# Patient Record
Sex: MALE | Race: WHITE | NOT HISPANIC OR LATINO
[De-identification: names, ages, dates, MRNs, and addresses within clinical notes are randomized per-mention and may not be internally consistent; named-entity substitution may affect disease eponyms.]

---

## 2018-06-07 ENCOUNTER — APPOINTMENT (OUTPATIENT)
Dept: ORTHOPEDIC SURGERY | Facility: CLINIC | Age: 33
End: 2018-06-07

## 2018-06-07 PROBLEM — Z00.00 ENCOUNTER FOR PREVENTIVE HEALTH EXAMINATION: Status: ACTIVE | Noted: 2018-06-07

## 2018-07-13 ENCOUNTER — APPOINTMENT (OUTPATIENT)
Dept: ORTHOPEDIC SURGERY | Facility: CLINIC | Age: 33
End: 2018-07-13
Payer: COMMERCIAL

## 2018-07-13 VITALS — WEIGHT: 185 LBS | HEIGHT: 71 IN | RESPIRATION RATE: 16 BRPM | BODY MASS INDEX: 25.9 KG/M2

## 2018-07-13 DIAGNOSIS — Z78.9 OTHER SPECIFIED HEALTH STATUS: ICD-10-CM

## 2018-07-13 PROCEDURE — 99203 OFFICE O/P NEW LOW 30 MIN: CPT

## 2018-07-13 PROCEDURE — 76882 US LMTD JT/FCL EVL NVASC XTR: CPT | Mod: RT

## 2018-07-13 RX ORDER — ESCITALOPRAM OXALATE 10 MG/1
10 TABLET ORAL
Qty: 30 | Refills: 0 | Status: DISCONTINUED | COMMUNITY
Start: 2018-04-18

## 2018-07-13 RX ORDER — AZELASTINE HYDROCHLORIDE 137 UG/1
0.1 SPRAY, METERED NASAL
Qty: 30 | Refills: 0 | Status: DISCONTINUED | COMMUNITY
Start: 2018-05-10

## 2018-07-13 RX ORDER — TRAZODONE HYDROCHLORIDE 50 MG/1
50 TABLET ORAL
Qty: 30 | Refills: 0 | Status: DISCONTINUED | COMMUNITY
Start: 2018-04-25

## 2018-07-13 RX ORDER — OSELTAMIVIR PHOSPHATE 75 MG/1
75 CAPSULE ORAL
Qty: 10 | Refills: 0 | Status: DISCONTINUED | COMMUNITY
Start: 2018-01-17

## 2018-07-13 RX ORDER — CLONAZEPAM 0.5 MG/1
0.5 TABLET ORAL
Qty: 180 | Refills: 0 | Status: DISCONTINUED | COMMUNITY
Start: 2018-03-01

## 2018-07-13 RX ORDER — ESCITALOPRAM OXALATE 20 MG/1
20 TABLET ORAL
Qty: 30 | Refills: 0 | Status: DISCONTINUED | COMMUNITY
Start: 2018-06-27

## 2018-07-13 RX ORDER — MOMETASONE 50 UG/1
50 SPRAY, METERED NASAL
Qty: 17 | Refills: 0 | Status: DISCONTINUED | COMMUNITY
Start: 2018-04-27

## 2018-09-25 ENCOUNTER — APPOINTMENT (OUTPATIENT)
Dept: ORTHOPEDIC SURGERY | Facility: CLINIC | Age: 33
End: 2018-09-25
Payer: COMMERCIAL

## 2018-09-25 VITALS — HEIGHT: 71 IN | BODY MASS INDEX: 25.9 KG/M2 | WEIGHT: 185 LBS | RESPIRATION RATE: 16 BRPM

## 2018-09-25 PROCEDURE — 20605 DRAIN/INJ JOINT/BURSA W/O US: CPT | Mod: RT

## 2018-09-25 PROCEDURE — 99214 OFFICE O/P EST MOD 30 MIN: CPT | Mod: 25

## 2018-12-10 ENCOUNTER — APPOINTMENT (OUTPATIENT)
Dept: ORTHOPEDIC SURGERY | Facility: CLINIC | Age: 33
End: 2018-12-10
Payer: COMMERCIAL

## 2018-12-10 VITALS — BODY MASS INDEX: 25.9 KG/M2 | HEIGHT: 71 IN | RESPIRATION RATE: 16 BRPM | WEIGHT: 185 LBS

## 2018-12-10 DIAGNOSIS — M25.531 PAIN IN RIGHT WRIST: ICD-10-CM

## 2018-12-10 DIAGNOSIS — R22.31 LOCALIZED SWELLING, MASS AND LUMP, RIGHT UPPER LIMB: ICD-10-CM

## 2018-12-10 PROCEDURE — 99214 OFFICE O/P EST MOD 30 MIN: CPT

## 2018-12-18 ENCOUNTER — CLINICAL ADVICE (OUTPATIENT)
Age: 33
End: 2018-12-18

## 2019-01-25 ENCOUNTER — APPOINTMENT (OUTPATIENT)
Dept: ORTHOPEDIC SURGERY | Facility: CLINIC | Age: 34
End: 2019-01-25
Payer: COMMERCIAL

## 2019-01-25 VITALS — RESPIRATION RATE: 16 BRPM | HEIGHT: 71 IN | WEIGHT: 185 LBS | BODY MASS INDEX: 25.9 KG/M2

## 2019-01-25 DIAGNOSIS — S69.82XD OTHER SPECIFIED INJURIES OF LEFT WRIST, HAND AND FINGER(S), SUBSEQUENT ENCOUNTER: ICD-10-CM

## 2019-01-25 DIAGNOSIS — S69.81XD OTHER SPECIFIED INJURIES OF RIGHT WRIST, HAND AND FINGER(S), SUBSEQUENT ENCOUNTER: ICD-10-CM

## 2019-01-25 DIAGNOSIS — M77.01 MEDIAL EPICONDYLITIS, RIGHT ELBOW: ICD-10-CM

## 2019-01-25 PROCEDURE — 73070 X-RAY EXAM OF ELBOW: CPT | Mod: RT

## 2019-01-25 PROCEDURE — 99214 OFFICE O/P EST MOD 30 MIN: CPT

## 2019-01-25 PROCEDURE — 73110 X-RAY EXAM OF WRIST: CPT | Mod: LT

## 2020-01-24 ENCOUNTER — APPOINTMENT (OUTPATIENT)
Dept: COLORECTAL SURGERY | Facility: CLINIC | Age: 35
End: 2020-01-24
Payer: COMMERCIAL

## 2020-01-24 VITALS
HEIGHT: 71 IN | BODY MASS INDEX: 26.69 KG/M2 | HEART RATE: 61 BPM | DIASTOLIC BLOOD PRESSURE: 68 MMHG | SYSTOLIC BLOOD PRESSURE: 118 MMHG | TEMPERATURE: 98 F | WEIGHT: 190.63 LBS

## 2020-01-24 DIAGNOSIS — L29.0 PRURITUS ANI: ICD-10-CM

## 2020-01-24 DIAGNOSIS — K64.4 RESIDUAL HEMORRHOIDAL SKIN TAGS: ICD-10-CM

## 2020-01-24 PROCEDURE — 46600 DIAGNOSTIC ANOSCOPY SPX: CPT

## 2020-01-24 PROCEDURE — 99204 OFFICE O/P NEW MOD 45 MIN: CPT | Mod: 25

## 2020-01-24 RX ORDER — ANORECTAL OINTMENT 15.7; .44; 24; 20.6 G/100G; G/100G; G/100G; G/100G
OINTMENT TOPICAL
Refills: 0 | Status: ACTIVE | COMMUNITY

## 2020-01-24 NOTE — HISTORY OF PRESENT ILLNESS
[FreeTextEntry1] : 34 year  old male with alex of anal pruritis  reports development of skin tag over the last year.  Rare  bleeding. \par Had biopsy that showed benign changes only (1 year ago)\par has used hydrocortisone in the past,  sometimes helps  and sometimes not. \par \par bowel movements are 4-5  times a week, generally formed,  occasional straining.  \par \par No prior colonoscopy.\par \par Family hx of pancreatic cancer (Dads mother.  No colorectal cancer.

## 2020-01-24 NOTE — PHYSICAL EXAM
[Abdomen Masses] : No abdominal masses [Abdomen Tenderness] : ~T No ~M abdominal tenderness [Normal rectal exam] : exam was normal [None] : no anal fissures seen [Excoriation] : excoriations [Fistula] : no fistulas [Wart] : no warts [Ulcer ___ cm] : no ulcers [Tender, Swollen] : nontender, non-swollen [Thrombosed] : that was not thrombosed [Skin Tags] : residual hemorrhoidal skin tags were noted [Normal] : was normal [Normal Heart Sounds] : normal heart sounds [2+] : left 2+ [Petechiae] : no petechiae [Purpura] : no purpura  [Skin Ulcer] : ulcer [Alert] : alert [Skin Induration] : no induration [Oriented to Person] : oriented to person [Calm] : calm [Oriented to Place] : oriented to place [de-identified] : benign [de-identified] : posterior perianalk tag 1 cm x 3 mm with ulcer at outer aspect (2 x 3 mm ulcer.   [de-identified] : internal exam WNL [de-identified] : anocopy: no fissure, no hemorrhoids [de-identified] : posterior perianal (as above)

## 2020-01-24 NOTE — ASSESSMENT
[FreeTextEntry1] : Has well treated pruritus ani with 1 skin tag and a small ulcer at the base of the tag (part farthest away from the anus). Had calmoseptine in place. Had biopsy 1 years ago.  Bx showed benign changes see report.  INflammatory changes only.\par Stop calmoseptine\par Instead try desitin or zinc oxide\par If that doesn’t help then, in 3-4 week, local anesthesia and then AgNO3 Rx of the ulcerated area.\par anus itself looks ok. No significant hemorrhoids and no fissure. \par Given full dietary recommendations, increase, water, fibre supplements, colace information\par \par Should consider colonoscopy:  Rectal Bleeding. We could recommend scope.  \par \par

## 2020-02-18 ENCOUNTER — APPOINTMENT (OUTPATIENT)
Dept: COLORECTAL SURGERY | Facility: CLINIC | Age: 35
End: 2020-02-18

## 2024-07-22 ENCOUNTER — APPOINTMENT (OUTPATIENT)
Dept: ORTHOPEDIC SURGERY | Facility: CLINIC | Age: 39
End: 2024-07-22
Payer: COMMERCIAL

## 2024-07-22 VITALS — BODY MASS INDEX: 23.1 KG/M2 | WEIGHT: 165 LBS | HEIGHT: 71 IN | RESPIRATION RATE: 16 BRPM

## 2024-07-22 DIAGNOSIS — M79.645 PAIN IN LEFT FINGER(S): ICD-10-CM

## 2024-07-22 DIAGNOSIS — Z78.9 OTHER SPECIFIED HEALTH STATUS: ICD-10-CM

## 2024-07-22 PROCEDURE — 73140 X-RAY EXAM OF FINGER(S): CPT | Mod: F3

## 2024-07-22 PROCEDURE — 99204 OFFICE O/P NEW MOD 45 MIN: CPT

## 2024-07-22 NOTE — HISTORY OF PRESENT ILLNESS
[Right] : right hand dominant [FreeTextEntry1] : Patient presents for evaluation of left ring finger pain and swelling status post hyperextension injury while playing basketball.  Date of injury April 12, 2024.  It has been 14 weeks 3 days since the injury. Patient denies any dislocation. Patient states he did see a physician in May 2024 In [facility] where x-rays were taken and according to the patient there was no acute fracture. He was told to carolann tape the ring finger to the middle finger during activities. He states he still has pain and swelling at PIP joint today.

## 2024-07-22 NOTE — ASSESSMENT
[FreeTextEntry1] : The patient has a volar plate radial collateral ligament sprain with secondary stiffness.  Recommend a course of therapy.  Would like to see back in 6 weeks however if Full motion and minimal pain does not need to follow-up

## 2024-07-22 NOTE — PHYSICAL EXAM
[de-identified] : Swollen PIP joint and along the radial collateral ligament.  Negative Hernando's test full extension but lacks full composite flexion particularly in the hook position [de-identified] : PA lateral oblique x-rays left ring finger normal

## 2024-08-30 VITALS — RESPIRATION RATE: 16 BRPM | WEIGHT: 165 LBS | HEIGHT: 71 IN | BODY MASS INDEX: 23.1 KG/M2

## 2024-09-03 ENCOUNTER — APPOINTMENT (OUTPATIENT)
Dept: ORTHOPEDIC SURGERY | Facility: CLINIC | Age: 39
End: 2024-09-03

## 2024-09-03 DIAGNOSIS — M79.645 PAIN IN LEFT FINGER(S): ICD-10-CM

## 2024-09-03 NOTE — HISTORY OF PRESENT ILLNESS
[Right] : right hand dominant [FreeTextEntry1] : Date of injury April 12, 2024.  4 months status post left ring finger PIP volar plate radial collateral ligament sprain with secondary stiffness.